# Patient Record
Sex: MALE | Race: BLACK OR AFRICAN AMERICAN | NOT HISPANIC OR LATINO | Employment: UNEMPLOYED | URBAN - METROPOLITAN AREA
[De-identification: names, ages, dates, MRNs, and addresses within clinical notes are randomized per-mention and may not be internally consistent; named-entity substitution may affect disease eponyms.]

---

## 2023-07-03 DIAGNOSIS — K50.90 CROHN'S DISEASE WITHOUT COMPLICATION, UNSPECIFIED GASTROINTESTINAL TRACT LOCATION: Primary | ICD-10-CM

## 2023-07-28 ENCOUNTER — TELEPHONE (OUTPATIENT)
Dept: GASTROENTEROLOGY | Facility: CLINIC | Age: 53
End: 2023-07-28

## 2023-07-28 NOTE — TELEPHONE ENCOUNTER
I did request records from Dr. Mercado, but they sent Dr. Dennis's records. I will contact Dr. Mercado's office.

## 2023-07-28 NOTE — TELEPHONE ENCOUNTER
----- Message from Ann Suarez sent at 7/28/2023 10:56 AM CDT -----  Regarding: Outside records  Pt has a referral in the q and it stated requesting records. The records are from Dr. Dennis's office 2019. Pt is currently est w/ Dr. Mercado in [place] and is being referred here by that  Will need those records. Pt's wife stated he is being referred here as Dr. Mercado is not informed with IBS or Crohns. Contact info is [phone]            Thank You  Arabella GARBER

## 2023-10-24 ENCOUNTER — OFFICE VISIT (OUTPATIENT)
Dept: GASTROENTEROLOGY | Facility: CLINIC | Age: 53
End: 2023-10-24
Payer: MEDICAID

## 2023-10-24 VITALS
TEMPERATURE: 98 F | HEIGHT: 66 IN | SYSTOLIC BLOOD PRESSURE: 126 MMHG | HEART RATE: 51 BPM | WEIGHT: 195.38 LBS | BODY MASS INDEX: 31.4 KG/M2 | RESPIRATION RATE: 12 BRPM | DIASTOLIC BLOOD PRESSURE: 72 MMHG

## 2023-10-24 DIAGNOSIS — K76.0 HEPATIC STEATOSIS: Primary | ICD-10-CM

## 2023-10-24 DIAGNOSIS — Z86.010 H/O ADENOMATOUS POLYP OF COLON: ICD-10-CM

## 2023-10-24 DIAGNOSIS — R93.89 ABNORMAL CT SCAN: ICD-10-CM

## 2023-10-24 PROBLEM — Z86.0101 H/O ADENOMATOUS POLYP OF COLON: Status: ACTIVE | Noted: 2023-10-24

## 2023-10-24 PROCEDURE — 99214 OFFICE O/P EST MOD 30 MIN: CPT | Mod: PBBFAC

## 2023-10-24 RX ORDER — SILDENAFIL 100 MG/1
100 TABLET, FILM COATED ORAL DAILY PRN
COMMUNITY
Start: 2023-10-14

## 2023-10-24 RX ORDER — LANSOPRAZOLE 30 MG/1
30 CAPSULE, DELAYED RELEASE ORAL DAILY
COMMUNITY
Start: 2023-10-14

## 2023-10-24 RX ORDER — LEVOCETIRIZINE DIHYDROCHLORIDE 5 MG/1
5 TABLET, FILM COATED ORAL DAILY PRN
COMMUNITY
Start: 2023-06-29

## 2023-10-24 RX ORDER — LOSARTAN POTASSIUM AND HYDROCHLOROTHIAZIDE 12.5; 5 MG/1; MG/1
1 TABLET ORAL DAILY
COMMUNITY
Start: 2023-10-14

## 2023-10-24 RX ORDER — POTASSIUM CHLORIDE 1500 MG/1
20 TABLET, EXTENDED RELEASE ORAL DAILY
COMMUNITY
Start: 2023-10-14

## 2023-10-24 RX ORDER — ROSUVASTATIN CALCIUM 10 MG/1
10 TABLET, COATED ORAL DAILY
COMMUNITY
Start: 2023-10-14

## 2023-10-24 RX ORDER — POLYETHYLENE GLYCOL 3350, SODIUM SULFATE, SODIUM CHLORIDE, POTASSIUM CHLORIDE, SODIUM ASCORBATE, AND ASCORBIC ACID 7.5-2.691G
2000 KIT ORAL ONCE
Qty: 1 KIT | Refills: 0 | Status: SHIPPED | OUTPATIENT
Start: 2023-10-24 | End: 2023-10-24

## 2023-10-24 NOTE — ASSESSMENT & PLAN NOTE
Check CMP  Schedule Fibroscan  Likely MASLD, will pursue further work-up if needed.     We discussed the manifestations of metabolic dysfunction associated steatotic liver disease. At this time, there are no FDA approved therapy for metabolic dysfunction associated steatotic liver disease.  The patient and I discussed the importance of diet, exercise, and weight loss for management of MASLD. We discussed a low fat, low carb/sugar, high fiber diet and a goal of 30 minutes of exercise 5 times per week  Advised weight loss (10%), strict glycemic control and lipid control (statins are ok if needed, prescribing doctor will need to monitor LFTs per routine)    Discussed that fatty liver can progress to steatohepatitis and possibly to cirrhosis, putting one at increased risk for liver cancer, liver failure, and death    Recommend to avoid alcohol consumption. It is know that heavy alcohol use is associated with disease progression among patients with fatty liver disease. Information is unknown at this time of the effects on the liver with alcohol use in moderation.

## 2023-10-24 NOTE — ASSESSMENT & PLAN NOTE
June 28, 2023: CT a/p with contrast: Segmental wall thickening/fatty infiltration of the ileum.  Fatty infiltration of wall of colon.  No evidence for acute inflammation.  Findings concerning for chronic inflammation such as IBD.     Chronic GI issues for years - no acute change in symptoms.   Check CBC, CMP, CRP, fecal calprotectin  Schedule colonoscopy

## 2023-10-24 NOTE — ASSESSMENT & PLAN NOTE
"June 16, 2023: Colonoscopy (Dr. Mercado): Benign 3-4 mm polyp in sigmoid s/p forceps polypectomy. 1.5 cm distal transverse colon "mass" s/p snare resection and tattoo.  Hepatic flexure 8-9 mm mass vs sessile polyp s/p partial forceps polypectomy with tattoo.  Pathology: tubular adenomas     Suspect polyps are endoscopically removable based on description from previous colonoscopy.  Will schedule repeat colonoscopy for definitive polypectomy  "

## 2023-10-24 NOTE — PROGRESS NOTES
Gastroenterology and Hepatology          New Patient Note         TODAY'S VISIT DATE:  10/24/2023    Reason for Consult:    Chief Complaint   Patient presents with    Crohn's Disease     ?? Abd pain, diarrhea, constipation       PCP: Hugh Callahan      Referring MD:   Paper Order    History of Present Illness:    Mr. Knox is a 52 year old AAM with adenomatous colon polyps, GERD here to establish care.     He has a 20 year history of severe crampy abdominal pain, which persists throughout the entire day. Pain is alleviated with holding the stomach, but patient cannot describe if alleviated with bowel movements. He reports having alternating constipation/diarrheal symptoms, noted that the past week he has been constipated, having minimal stools, and the previous week he was having dark brown loose stool movements that were 3-4x/day. Denies any melana or hematochezia. He has been limited in his ability to work because he has weakness, presyncopal episodes, but denies any actual loss of consciousness. Has variable PO intake, at times has small meals, and has not had any recent weight loss. He has been on multiple PPIs, which initially work, but then become ineffective at treating his stomach pain. He does feel relief from an acid reflux standpoint, and notes that if he does not take it, the pain would likely be a lot worse. Otherwise, denies any chest pain, nausea/vomiting.    He was referred to Dr. Mercado for evaluation of symptoms and colonoscopy.  Colonoscopy showed small polyps (largest 1.5 cm) all adenomas.  One polyp not able to be removed.  He was evaluated then for surgical resection of polyps.  Pre operative CT scan showed findings concerning for TI disease.  He was subsequently referred here.      He denies any FH of IBD, colon polyps or colon cancer.  Former smoker.  He denies any alcohol or recreational drug use.        Pertinent Endoscopy/Imaging:  February 13, 2019: EGD (Dr. Dennis):  "necrotic appearing mucosa from mid to distal esophagus with surrounding inflammation, small HH. Mild erosive gastritis    March 20, 2019: EGD (Dr. Dennis): normal esophagus, stomach, duodenum    September 18, 2019: Colonoscopy (Dr. Dennis): Grade II hemorrhoids otherwise normal colon    June 16, 2023: Colonoscopy (Dr. Mercado): Benign 3-4 mm polyp in sigmoid s/p forceps polypectomy. 1.5 cm distal transverse colon "mass" s/p snare resection and tattoo.  Hepatic flexure 8-9 mm mass vs sessile polyp s/p partial forceps polypectomy with tattoo.  Pathology: tubular adenomas     June 28, 2023: CT a/p with contrast: Segmental wall thickening/fatty infiltration of the ileum.  Fatty infiltration of wall of colon.  No evidence for acute inflammation.  Findings concerning for chronic inflammation such as IBD.  Hepatic steatosis.         Review of Systems   Constitutional:  Positive for fatigue. Negative for unexpected weight change.   Respiratory:  Positive for shortness of breath.    Gastrointestinal:  Positive for abdominal pain, change in bowel habit, constipation, diarrhea and reflux. Negative for abdominal distention, anal bleeding, blood in stool, nausea, rectal pain, vomiting and fecal incontinence.   Musculoskeletal:  Positive for arthralgias (knee and hips). Negative for back pain, gait problem, joint swelling and joint deformity.   Neurological:  Negative for dizziness and light-headedness.       Medical/Surgical History:   Past Medical History:   Diagnosis Date    Crohn's disease     Essential (primary) hypertension     GERD (gastroesophageal reflux disease)      Past Surgical History:   Procedure Laterality Date    COLONOSCOPY      ESOPHAGOGASTRODUODENOSCOPY           Family History:   Family History   Problem Relation Age of Onset    Diabetes Mother     Heart disease Mother     Cancer Father     Cervical cancer Sister     Heart failure Brother     Crohn's disease Other         Social History:   Social History " "    Socioeconomic History    Marital status:    Tobacco Use    Smoking status: Former     Types: Cigarettes     Passive exposure: Never    Smokeless tobacco: Never    Tobacco comments:     Quit 1994   Substance and Sexual Activity    Alcohol use: Never    Drug use: Yes     Types: Marijuana        Review of patient's allergies indicates:  No Known Allergies    Current Medications:   Outpatient Medications Marked as Taking for the 10/24/23 encounter (Office Visit) with RESIDENT, [facility] GASTR[facility]OLOGY   Medication Sig Dispense Refill    lansoprazole (PREVACID) 30 MG capsule Take 30 mg by mouth once daily.      levocetirizine (XYZAL) 5 MG tablet Take 5 mg by mouth daily as needed.      losartan-hydrochlorothiazide 50-12.5 mg (HYZAAR) 50-12.5 mg per tablet Take 1 tablet by mouth once daily.      potassium chloride (K-TAB) 20 mEq Take 20 mEq by mouth once daily.      rosuvastatin (CRESTOR) 10 MG tablet Take 10 mg by mouth once daily.      sildenafiL (VIAGRA) 100 MG tablet Take 100 mg by mouth daily as needed.          Vital Signs:  /72 (BP Location: Left arm, Patient Position: Sitting, BP Method: Medium (Automatic))   Pulse (!) 51   Temp 98.1 °F (36.7 °C) (Oral)   Resp 12   Ht 5' 6" (1.676 m)   Wt 88.6 kg (195 lb 6.4 oz)   BMI 31.54 kg/m²      Physical Exam  Vitals reviewed.   Constitutional:       Appearance: Normal appearance. He is obese.   HENT:      Head: Normocephalic and atraumatic.      Mouth/Throat:      Mouth: Mucous membranes are moist.   Eyes:      Extraocular Movements: Extraocular movements intact.      Conjunctiva/sclera: Conjunctivae normal.   Cardiovascular:      Rate and Rhythm: Normal rate and regular rhythm.   Pulmonary:      Effort: Pulmonary effort is normal.      Breath sounds: Normal breath sounds.   Abdominal:      General: Bowel sounds are normal.      Palpations: Abdomen is soft.      Tenderness: There is no abdominal tenderness.   Musculoskeletal:      Cervical back: Normal " "range of motion.   Skin:     General: Skin is warm and dry.   Neurological:      General: No focal deficit present.      Mental Status: He is alert and oriented to person, place, and time.   Psychiatric:         Mood and Affect: Mood normal.         Behavior: Behavior normal.         Labs: Reviewed  Hgb   Date Value Ref Range Status   10/24/2023 14.6 14.0 - 18.0 g/dL Final     Albumin Level   Date Value Ref Range Status   10/24/2023 4.1 3.5 - 5.0 g/dL Final     Iron Level   Date Value Ref Range Status   10/24/2023 76 65 - 175 ug/dL Final     Ferritin Level   Date Value Ref Range Status   10/24/2023 141.45 21.81 - 274.66 ng/mL Final     Vit D 25 OH   Date Value Ref Range Status   10/24/2023 117.8 (H) 30.0 - 80.0 ng/mL Final     C-Reactive Protein   Date Value Ref Range Status   10/24/2023 4.10 <5.00 mg/L Final      Fecal calprotectin:       Assessment/Plan:      Problem List Items Addressed This Visit          GI    H/O adenomatous polyp of colon     June 16, 2023: Colonoscopy (Dr. Mercado): Benign 3-4 mm polyp in sigmoid s/p forceps polypectomy. 1.5 cm distal transverse colon "mass" s/p snare resection and tattoo.  Hepatic flexure 8-9 mm mass vs sessile polyp s/p partial forceps polypectomy with tattoo.  Pathology: tubular adenomas     Suspect polyps are endoscopically removable based on description from previous colonoscopy.  Will schedule repeat colonoscopy for definitive polypectomy         Relevant Orders    Vitamin D (Completed)    Varicella Zoster Antibody, IgG (Completed)    Iron and TIBC (Completed)    Hepatitis B Surface Ab, Qualitative (Completed)    Hepatitis A antibody, IgG (Completed)    Ferritin (Completed)    C-Reactive Protein (Completed)    Comprehensive Metabolic Panel (Completed)    CBC Auto Differential (Completed)    Hepatic steatosis - Primary     Check CMP  Schedule Fibroscan  Likely MASLD, will pursue further work-up if needed.     We discussed the manifestations of metabolic dysfunction " associated steatotic liver disease. At this time, there are no FDA approved therapy for metabolic dysfunction associated steatotic liver disease.  The patient and I discussed the importance of diet, exercise, and weight loss for management of MASLD. We discussed a low fat, low carb/sugar, high fiber diet and a goal of 30 minutes of exercise 5 times per week  Advised weight loss (10%), strict glycemic control and lipid control (statins are ok if needed, prescribing doctor will need to monitor LFTs per routine)    Discussed that fatty liver can progress to steatohepatitis and possibly to cirrhosis, putting one at increased risk for liver cancer, liver failure, and death    Recommend to avoid alcohol consumption. It is know that heavy alcohol use is associated with disease progression among patients with fatty liver disease. Information is unknown at this time of the effects on the liver with alcohol use in moderation.           Relevant Orders    Vitamin D (Completed)    Varicella Zoster Antibody, IgG (Completed)    Iron and TIBC (Completed)    Hepatitis B Surface Ab, Qualitative (Completed)    Hepatitis A antibody, IgG (Completed)    Ferritin (Completed)    C-Reactive Protein (Completed)    Comprehensive Metabolic Panel (Completed)    CBC Auto Differential (Completed)    US Elastography Liver w/imaging       Other    Abnormal CT scan     June 28, 2023: CT a/p with contrast: Segmental wall thickening/fatty infiltration of the ileum.  Fatty infiltration of wall of colon.  No evidence for acute inflammation.  Findings concerning for chronic inflammation such as IBD.     Chronic GI issues for years - no acute change in symptoms.   Check CBC, CMP, CRP, fecal calprotectin  Schedule colonoscopy         Relevant Orders    CBC Auto Differential    Comprehensive Metabolic Panel    C-Reactive Protein    Calprotectin, Stool    Iron and TIBC    Ferritin    Vitamin D    Hepatitis A antibody, IgG    Hepatitis B Surface Ab,  Qualitative    Varicella Zoster Antibody, IgG    Case Request Endoscopy: EGD (ESOPHAGOGASTRODUODENOSCOPY), COLONOSCOPY (Completed)    Vitamin D (Completed)    Varicella Zoster Antibody, IgG (Completed)    Iron and TIBC (Completed)    Hepatitis B Surface Ab, Qualitative (Completed)    Hepatitis A antibody, IgG (Completed)    Ferritin (Completed)    C-Reactive Protein (Completed)    Comprehensive Metabolic Panel (Completed)    CBC Auto Differential (Completed)               HEALTH MAINTENANCE:     Vaccinations:    Influenza (inactive):  recommended annually   PCV 20 (Prevnar): recommended  Tetanus (TdaP): recommended every 10 years  HPV (males and females ages 11-44 yo): N/A  Meningococcal: UTD, No risk factors   Hepatitis B: Check HBsAb  Hepatitis A:  Check HAV IgG  MMR (live vaccine): UTD        Chickenpox status/Varicella (live vaccine):  will check immunity     Shingrix: recommend  COVID-19: vaccinated 2021      Colorectal cancer risk:  Risk factors: >  8 years of disease, > 1/3 colon involved    - Distribution of colonic disease: > 1/3 of colon     - Year of symptom onset:    - Colonoscopy every 1-3 years after endoscopic remission    Ophthalmologic exam recommended yearly  Dermatologic exam recommended yearly due to risk of skin cancer with IBD/meds    Bone health:   Calcium 9671-5858 mg daily and vitamin D 800 IU daily   Risk factors for osteopenia/osteoporosis: none   Vitamin D:    Ergocalciferol 50,000 IU weekly x 12 weeks     DEXA scan: Recommend baseline    Smoking status: former    Follow up: following colonoscopy      Attending attestation to follow.    Maximiliano Robles DO  [facility], PGY-II

## 2023-11-01 NOTE — PROGRESS NOTES
GASTROENTEROLOGY/HEPATOLOGY ATTENDING ADDENDUM:     I have seen the patient, reviewed the resident's history, physical, assessment, and plan. I have personally interviewed and examined the patient.       My changes and additions to history, physical, assessment and plan reflected and outlined in resident's note.           Aurea Lara MD, MPH   of Clinical Medicine  Gastroenterology and Hepatology   [facility]

## 2023-12-01 ENCOUNTER — PROCEDURE VISIT (OUTPATIENT)
Dept: INFECTIOUS DISEASES | Facility: CLINIC | Age: 53
End: 2023-12-01
Payer: MEDICAID

## 2023-12-01 ENCOUNTER — TELEPHONE (OUTPATIENT)
Dept: GASTROENTEROLOGY | Facility: CLINIC | Age: 53
End: 2023-12-01
Payer: MEDICAID

## 2023-12-01 DIAGNOSIS — K76.0 HEPATIC STEATOSIS: ICD-10-CM

## 2023-12-01 PROCEDURE — 91200 LIVER ELASTOGRAPHY: CPT | Mod: PBBFAC | Performed by: INTERNAL MEDICINE

## 2024-02-07 ENCOUNTER — PATIENT MESSAGE (OUTPATIENT)
Dept: ADMINISTRATIVE | Facility: OTHER | Age: 54
End: 2024-02-07
Payer: MEDICAID

## 2024-02-09 ENCOUNTER — ANESTHESIA EVENT (OUTPATIENT)
Dept: ENDOSCOPY | Facility: HOSPITAL | Age: 54
End: 2024-02-09
Payer: MEDICAID

## 2024-02-09 ENCOUNTER — ANESTHESIA (OUTPATIENT)
Dept: ENDOSCOPY | Facility: HOSPITAL | Age: 54
End: 2024-02-09
Payer: MEDICAID

## 2024-02-09 ENCOUNTER — HOSPITAL ENCOUNTER (OUTPATIENT)
Facility: HOSPITAL | Age: 54
Discharge: HOME | End: 2024-02-09
Attending: INTERNAL MEDICINE | Admitting: INTERNAL MEDICINE
Payer: MEDICAID

## 2024-02-09 VITALS
OXYGEN SATURATION: 99 % | BODY MASS INDEX: 26.84 KG/M2 | DIASTOLIC BLOOD PRESSURE: 72 MMHG | RESPIRATION RATE: 17 BRPM | HEART RATE: 60 BPM | TEMPERATURE: 97 F | SYSTOLIC BLOOD PRESSURE: 112 MMHG | HEIGHT: 66 IN | WEIGHT: 167 LBS

## 2024-02-09 DIAGNOSIS — Z86.010 H/O ADENOMATOUS POLYP OF COLON: Primary | ICD-10-CM

## 2024-02-09 DIAGNOSIS — R93.89 ABNORMAL CT SCAN: ICD-10-CM

## 2024-02-09 PROCEDURE — 63600175 PHARM REV CODE 636 W HCPCS: Performed by: NURSE ANESTHETIST, CERTIFIED REGISTERED

## 2024-02-09 PROCEDURE — 88312 SPECIAL STAINS GROUP 1: CPT

## 2024-02-09 PROCEDURE — 27201423 OPTIME MED/SURG SUP & DEVICES STERILE SUPPLY: Performed by: INTERNAL MEDICINE

## 2024-02-09 PROCEDURE — 25000003 PHARM REV CODE 250: Performed by: NURSE ANESTHETIST, CERTIFIED REGISTERED

## 2024-02-09 PROCEDURE — 37000008 HC ANESTHESIA 1ST 15 MINUTES: Performed by: INTERNAL MEDICINE

## 2024-02-09 PROCEDURE — 88313 SPECIAL STAINS GROUP 2: CPT

## 2024-02-09 PROCEDURE — D9220A PRA ANESTHESIA: Mod: CRNA,,, | Performed by: NURSE ANESTHETIST, CERTIFIED REGISTERED

## 2024-02-09 PROCEDURE — 45385 COLONOSCOPY W/LESION REMOVAL: CPT | Performed by: INTERNAL MEDICINE

## 2024-02-09 PROCEDURE — 37000009 HC ANESTHESIA EA ADD 15 MINS: Performed by: INTERNAL MEDICINE

## 2024-02-09 PROCEDURE — 43239 EGD BIOPSY SINGLE/MULTIPLE: CPT | Performed by: INTERNAL MEDICINE

## 2024-02-09 PROCEDURE — 88305 TISSUE EXAM BY PATHOLOGIST: CPT | Performed by: INTERNAL MEDICINE

## 2024-02-09 PROCEDURE — D9220A PRA ANESTHESIA: Mod: ANES,,, | Performed by: ANESTHESIOLOGY

## 2024-02-09 RX ORDER — LIDOCAINE HYDROCHLORIDE 20 MG/ML
INJECTION, SOLUTION EPIDURAL; INFILTRATION; INTRACAUDAL; PERINEURAL
Status: DISCONTINUED | OUTPATIENT
Start: 2024-02-09 | End: 2024-02-09

## 2024-02-09 RX ORDER — KETAMINE HYDROCHLORIDE 100 MG/ML
INJECTION, SOLUTION INTRAMUSCULAR; INTRAVENOUS
Status: DISCONTINUED | OUTPATIENT
Start: 2024-02-09 | End: 2024-02-09

## 2024-02-09 RX ORDER — PROPOFOL 10 MG/ML
VIAL (ML) INTRAVENOUS
Status: COMPLETED
Start: 2024-02-09 | End: 2024-02-09

## 2024-02-09 RX ORDER — LIDOCAINE HYDROCHLORIDE 20 MG/ML
INJECTION, SOLUTION EPIDURAL; INFILTRATION; INTRACAUDAL; PERINEURAL
Status: COMPLETED
Start: 2024-02-09 | End: 2024-02-09

## 2024-02-09 RX ORDER — KETAMINE HCL IN 0.9 % NACL 50 MG/5 ML
SYRINGE (ML) INTRAVENOUS
Status: DISCONTINUED
Start: 2024-02-09 | End: 2024-02-09 | Stop reason: HOSPADM

## 2024-02-09 RX ORDER — GLYCOPYRROLATE 0.2 MG/ML
INJECTION INTRAMUSCULAR; INTRAVENOUS
Status: DISCONTINUED
Start: 2024-02-09 | End: 2024-02-09 | Stop reason: HOSPADM

## 2024-02-09 RX ORDER — PROPOFOL 10 MG/ML
VIAL (ML) INTRAVENOUS CONTINUOUS PRN
Status: DISCONTINUED | OUTPATIENT
Start: 2024-02-09 | End: 2024-02-09

## 2024-02-09 RX ADMIN — PROPOFOL 200 MCG/KG/MIN: 10 INJECTION, EMULSION INTRAVENOUS at 09:02

## 2024-02-09 RX ADMIN — PROPOFOL 50 MG: 10 INJECTION, EMULSION INTRAVENOUS at 09:02

## 2024-02-09 RX ADMIN — SODIUM CHLORIDE: 0.9 INJECTION, SOLUTION INTRAVENOUS at 09:02

## 2024-02-09 RX ADMIN — LIDOCAINE HYDROCHLORIDE 5 ML: 20 INJECTION, SOLUTION INTRAVENOUS at 09:02

## 2024-02-09 RX ADMIN — KETAMINE HYDROCHLORIDE 10 MG: 100 INJECTION INTRAMUSCULAR; INTRAVENOUS at 09:02

## 2024-02-09 NOTE — ANESTHESIA PREPROCEDURE EVALUATION
02/09/2024  Aakash Knox is a 53 y.o., male.    Pre-op Diagnosis: * No pre-op diagnosis entered *    Procedure(s):  DOUBLE  COLON     Review of patient's allergies indicates:  No Known Allergies    Current Outpatient Medications   Medication Instructions    lansoprazole (PREVACID) 30 mg, Oral, Daily    levocetirizine (XYZAL) 5 mg, Oral, Daily PRN    losartan-hydrochlorothiazide 50-12.5 mg (HYZAAR) 50-12.5 mg per tablet 1 tablet, Oral, Daily    potassium chloride (K-TAB) 20 mEq 20 mEq, Oral, Daily    rosuvastatin (CRESTOR) 10 mg, Oral, Daily    sildenafiL (VIAGRA) 100 mg, Oral, Daily PRN       DOUBLE;COLON;SD COLONOSCOPY,DIAGNOSTIC [45325];SD EGD, FLEX*    Past Medical History:   Diagnosis Date    Essential (primary) hypertension     GERD (gastroesophageal reflux disease)     Mixed hyperlipidemia        Past Surgical History:   Procedure Laterality Date    COLONOSCOPY      ESOPHAGOGASTRODUODENOSCOPY        Lab Results   Component Value Date    WBC 6.58 10/24/2023    HGB 14.6 10/24/2023    HCT 42.5 10/24/2023    MCV 80.5 10/24/2023     10/24/2023          BMP  Lab Results   Component Value Date     10/24/2023    K 3.9 10/24/2023    CO2 27 10/24/2023    BUN 10.5 10/24/2023    CREATININE 0.97 10/24/2023    CALCIUM 9.8 10/24/2023        Cardiac Cath 9/16/2020  Right coronary artery large and dominant free of any disease.  Left main   coronary artery is normal LAD is normal circumflex arteries normal EF is   around 60% with a hand-injection            Pre-op Assessment    I have reviewed the Patient Summary Reports.    I have reviewed the NPO Status.   I have reviewed the Medications.     Review of Systems  Anesthesia Hx:  No problems with previous Anesthesia             Denies Family Hx of Anesthesia complications.    Denies Personal Hx of Anesthesia complications.                     Social:  Non-Smoker       Cardiovascular:  Exercise tolerance: good   Hypertension       Denies Angina.   Denies Orthopnea.  Denies PND.    Denies LORD.    Functional Capacity good / => 4 METS                         Hepatic/GI:     GERD Liver Disease,            Musculoskeletal:  Musculoskeletal Normal                Neurological:  Denies TIA.  Denies CVA.                                    Psych:  Psychiatric Normal                    Physical Exam  General: Well nourished, Alert and Oriented    Airway:  Mallampati: III   Mouth Opening: Normal  TM Distance: Normal  Tongue: Normal  Neck ROM: Normal ROM    Dental:  Chipped upper central incisor, missing some rear molars (one pulled this week)  Chest/Lungs:  Clear to auscultation    Heart:  Rate: Normal  Rhythm: Regular Rhythm  No pretibial edema  No carotid bruits      Anesthesia Plan  Type of Anesthesia, risks & benefits discussed:    Anesthesia Type: Gen Natural Airway  Intra-op Monitoring Plan: Standard ASA Monitors  Post Op Pain Control Plan: IV/PO Opioids PRN  Induction:  IV  Informed Consent: Informed consent signed with the Patient and all parties understand the risks and agree with anesthesia plan.  All questions answered. Patient consented to blood products? No  ASA Score: 3  Day of Surgery Review of History & Physical: H&P Update referred to the surgeon/provider.  Anesthesia Plan Notes: GA TIVA    Ready For Surgery From Anesthesia Perspective.     .

## 2024-02-09 NOTE — PROVATION PATIENT INSTRUCTIONS
Discharge Summary/Instructions after an Endoscopic Procedure  Patient Name: Aakash Knox  Patient MRN: [id number]  Patient YOB: 1970 Friday, February 9, 2024  Aurea Lara MD  Dear patient,  As a result of recent federal legislation (The Federal Cures Act), you may   receive lab or pathology results from your procedure in your [facility]   account before your physician is able to contact you. Your physician or   their representative will relay the results to you with their   recommendations at their soonest availability.  Thank you,  RESTRICTIONS:  During your procedure today, you received medications for sedation.  These   medications may affect your judgment, balance and coordination.  Therefore,   for 24 hours, you have the following restrictions:   - DO NOT drive a car, operate machinery, make legal/financial decisions,   sign important papers or drink alcohol.    ACTIVITY:  Today: no heavy lifting, straining or running due to procedural   sedation/anesthesia.  The following day: return to full activity including work.  DIET:  Eat and drink normally unless instructed otherwise.     TREATMENT FOR COMMON SIDE EFFECTS:  - Mild abdominal pain, nausea, belching, bloating or excessive gas:  rest,   eat lightly and use a heating pad.  - Sore Throat: treat with throat lozenges and/or gargle with warm salt   water.  - Because air was used during the procedure, expelling large amounts of air   from your rectum or belching is normal.  - If a bowel prep was taken, you may not have a bowel movement for 1-3 days.    This is normal.  SYMPTOMS TO WATCH FOR AND REPORT TO YOUR PHYSICIAN:  1. Abdominal pain or bloating, other than gas cramps.  2. Chest pain.  3. Back pain.  4. Signs of infection such as: chills or fever occurring within 24 hours   after the procedure.  5. Rectal bleeding, which would show as bright red, maroon, or black stools.   (A tablespoon of blood from the rectum is not serious,  especially if   hemorrhoids are present.)  6. Vomiting.  7. Weakness or dizziness.  GO DIRECTLY TO THE NEAREST EMERGENCY ROOM IF YOU HAVE ANY OF THE FOLLOWING:      Difficulty breathing              Chills and/or fever over 101 F   Persistent vomiting and/or vomiting blood   Severe abdominal pain   Severe chest pain   Black, tarry stools   Bleeding- more than one tablespoon   Any other symptom or condition that you feel may need urgent attention  Your doctor recommends these additional instructions:  If any biopsies were taken, your doctors clinic will contact you in 1 to 2   weeks with any results.  - Patient has a contact number available for emergencies.  The signs and   symptoms of potential delayed complications were discussed with the   patient.  Return to normal activities tomorrow.  Written discharge   instructions were provided to the patient.   - Discharge patient to home.   - Resume previous diet.   - Continue present medications.   - Await pathology results.  For questions, problems or results please call your physician - Aurea Lara MD at Work:  [phone], Work:  [phone].  [facility], EMERGENCY ROOM PHONE NUMBER: [phone]  IF A COMPLICATION OR EMERGENCY SITUATION ARISES AND YOU ARE UNABLE TO REACH   YOUR PHYSICIAN - GO DIRECTLY TO THE EMERGENCY ROOM.  Aurea Lara MD  2/9/2024 10:13:29 AM  This report has been verified and signed electronically.  Dear patient,  As a result of recent federal legislation (The Federal Cures Act), you may   receive lab or pathology results from your procedure in your [facility]   account before your physician is able to contact you. Your physician or   their representative will relay the results to you with their   recommendations at their soonest availability.  Thank you,  PROVATION

## 2024-02-09 NOTE — ANESTHESIA POSTPROCEDURE EVALUATION
Anesthesia Post Evaluation    Patient: Aakash Knox    Procedure(s) Performed: Procedure(s) (LRB):  DOUBLE (N/A)  COLON (N/A)  EGD, WITH CLOSED BIOPSY    Final Anesthesia Type: general      Patient location during evaluation: PACU  Patient participation: Yes- Able to Participate  Level of consciousness: awake and alert and oriented  Post-procedure vital signs: reviewed and stable  Pain management: adequate  Airway patency: patent    PONV status at discharge: No PONV  Anesthetic complications: no      Cardiovascular status: hemodynamically stable  Respiratory status: unassisted  Hydration status: euvolemic  Follow-up not needed.              Vitals Value Taken Time   /60 02/09/24 1010   Temp 36 °C (96.8 °F) 02/09/24 0957   Pulse 63 02/09/24 1010   Resp 22 02/09/24 1010   SpO2 65 % 02/09/24 1010         No case tracking events are documented in the log.      Pain/Artemio Score: Artemio Score: 10 (2/9/2024 10:11 AM)

## 2024-02-09 NOTE — PROVATION PATIENT INSTRUCTIONS
Discharge Summary/Instructions after an Endoscopic Procedure  Patient Name: Aakash Knox  Patient MRN: [id number]  Patient YOB: 1970 Friday, February 9, 2024  Aurea Lara MD  Dear patient,  As a result of recent federal legislation (The Federal Cures Act), you may   receive lab or pathology results from your procedure in your [facility]   account before your physician is able to contact you. Your physician or   their representative will relay the results to you with their   recommendations at their soonest availability.  Thank you,  RESTRICTIONS:  During your procedure today, you received medications for sedation.  These   medications may affect your judgment, balance and coordination.  Therefore,   for 24 hours, you have the following restrictions:   - DO NOT drive a car, operate machinery, make legal/financial decisions,   sign important papers or drink alcohol.    ACTIVITY:  Today: no heavy lifting, straining or running due to procedural   sedation/anesthesia.  The following day: return to full activity including work.  DIET:  Eat and drink normally unless instructed otherwise.     TREATMENT FOR COMMON SIDE EFFECTS:  - Mild abdominal pain, nausea, belching, bloating or excessive gas:  rest,   eat lightly and use a heating pad.  - Sore Throat: treat with throat lozenges and/or gargle with warm salt   water.  - Because air was used during the procedure, expelling large amounts of air   from your rectum or belching is normal.  - If a bowel prep was taken, you may not have a bowel movement for 1-3 days.    This is normal.  SYMPTOMS TO WATCH FOR AND REPORT TO YOUR PHYSICIAN:  1. Abdominal pain or bloating, other than gas cramps.  2. Chest pain.  3. Back pain.  4. Signs of infection such as: chills or fever occurring within 24 hours   after the procedure.  5. Rectal bleeding, which would show as bright red, maroon, or black stools.   (A tablespoon of blood from the rectum is not serious,  especially if   hemorrhoids are present.)  6. Vomiting.  7. Weakness or dizziness.  GO DIRECTLY TO THE NEAREST EMERGENCY ROOM IF YOU HAVE ANY OF THE FOLLOWING:      Difficulty breathing              Chills and/or fever over 101 F   Persistent vomiting and/or vomiting blood   Severe abdominal pain   Severe chest pain   Black, tarry stools   Bleeding- more than one tablespoon   Any other symptom or condition that you feel may need urgent attention  Your doctor recommends these additional instructions:  If any biopsies were taken, your doctors clinic will contact you in 1 to 2   weeks with any results.  - Patient has a contact number available for emergencies.  The signs and   symptoms of potential delayed complications were discussed with the   patient.  Return to normal activities tomorrow.  Written discharge   instructions were provided to the patient.   - Discharge patient to home.   - Resume previous diet.   - Continue present medications.   - Await pathology results.   - Repeat colonoscopy in 3 years for surveillance.   - Use fiber, for example Citrucel, Fibercon, Konsyl or Metamucil.   - Will follow-up pathology and treat for IBS at this time.  For questions, problems or results please call your physician - Aurea Lara MD at Work:  [phone], Work:  [phone].  [facility], EMERGENCY ROOM PHONE NUMBER: [phone]  IF A COMPLICATION OR EMERGENCY SITUATION ARISES AND YOU ARE UNABLE TO REACH   YOUR PHYSICIAN - GO DIRECTLY TO THE EMERGENCY ROOM.  Aurea Lara MD  2/9/2024 10:11:29 AM  This report has been verified and signed electronically.  Dear patient,  As a result of recent federal legislation (The Federal Cures Act), you may   receive lab or pathology results from your procedure in your [facility]   account before your physician is able to contact you. Your physician or   their representative will relay the results to you with their   recommendations at their soonest  availability.  Thank you,  PROVATION

## 2024-02-09 NOTE — H&P
EGD and Colonoscopy History and Physical    Patient Name: Aakash Knox  MRN: [id number]  : 1970  Date of Procedure:  2024  Referring Physician: RESIDENT, [facility] CM*  Primary Physician: Hugh Callahan, RAMONE  Procedure Physician: Aurea Lara MD, MPH     Procedure - EGD and Colonoscopy  ASA - per anesthesia  Mallampati - per anesthesia  History of Anesthesia problems - no  Family history Anesthesia problems -  no   Plan of anesthesia - General    Diagnosis:  abdomina pain,  altered bowel habits  Chief Complaint: Same as above    HPI: Patient is an 53 y.o. male is here for the above.     Mr. Knox is a 53 year old AAM with adenomatous colon polyps, GERD here for an EGD and colonoscopy.      He has a 20 year history of severe crampy abdominal pain, which persists throughout the entire day. Pain is alleviated with holding the stomach, but patient cannot describe if alleviated with bowel movements. He reports having alternating constipation/diarrheal symptoms and when initially seen he was having more constipation.   Denies any melena or hematochezia. He has been limited in his ability to work because he has weakness, presyncopal episodes, but denies any actual loss of consciousness. Rep[orted variable PO intake, at times has small meals, but had not had any recent weight loss. He has been on multiple PPIs, which initially work, but then become ineffective at treating his stomach pain. He does feel relief from an acid reflux standpoint, and notes that if he does not take it, the pain would likely be a lot worse. Otherwise, denied any chest pain, nausea/vomiting.     He was referred to Dr. Mercado for evaluation of symptoms and colonoscopy.  Colonoscopy 2023 showed small polyps (largest 1.5 cm) all adenomas.  One polyp not able to be removed.  He was evaluated then for surgical resection of polyps.  Pre operative CT scan showed findings concerning for TI disease.  He was subsequently referred  "here.      October 2023 CRP: 4.1 (normal). Normal CBC, CMP, ferritin.   December 2023 fecal calprotectin: 159    Today he reports continued alternating diarrhea and constipation along with abdominal pain and cramping.  This past week had significant cramping for a few hours followed by diarrhea.  He has some referred pain to his groin with the pain.  Otherwise no changes in symptoms.      He denies any FH of IBD, colon polyps or colon cancer.  Former smoker.  He denies any alcohol or recreational drug use.          Pertinent Endoscopy/Imaging:  February 13, 2019: EGD (Dr. Dennis): necrotic appearing mucosa from mid to distal esophagus with surrounding inflammation, small HH. Mild erosive gastritis     March 20, 2019: EGD (Dr. Dennis): normal esophagus, stomach, duodenum     September 18, 2019: Colonoscopy (Dr. Dennis): Grade II hemorrhoids otherwise normal colon     June 16, 2023: Colonoscopy (Dr. Mercado): Benign 3-4 mm polyp in sigmoid s/p forceps polypectomy. 1.5 cm distal transverse colon "mass" s/p snare resection and tattoo.  Hepatic flexure 8-9 mm mass vs sessile polyp s/p partial forceps polypectomy with tattoo.  Pathology: tubular adenomas      June 28, 2023: CT a/p with contrast: Segmental wall thickening/fatty infiltration of the ileum.  Fatty infiltration of wall of colon.  No evidence for acute inflammation.  Findings concerning for chronic inflammation such as IBD.  Hepatic steatosis.          ROS:  Constitutional: No fevers, chills, No weight loss  CV: No chest pain  Pulm: No cough, No shortness of breath  GI: see HPI    Medical History:   Past Medical History:   Diagnosis Date    Essential (primary) hypertension     GERD (gastroesophageal reflux disease)     Mixed hyperlipidemia          Surgical History:   Past Surgical History:   Procedure Laterality Date    COLONOSCOPY      ESOPHAGOGASTRODUODENOSCOPY         Family History:   Family History   Problem Relation Age of Onset    Diabetes Mother     " "Heart disease Mother     Cancer Father     Cervical cancer Sister     Heart failure Brother     Crohn's disease Other    .    Social History:   Social History     Socioeconomic History    Marital status:    Tobacco Use    Smoking status: Former     Types: Cigarettes     Passive exposure: Never    Smokeless tobacco: Never    Tobacco comments:     Quit 1994   Substance and Sexual Activity    Alcohol use: Never    Drug use: Yes     Types: Marijuana       Review of patient's allergies indicates:  No Known Allergies    Medications:   Medications Prior to Admission   Medication Sig Dispense Refill Last Dose    lansoprazole (PREVACID) 30 MG capsule Take 30 mg by mouth once daily.   2/8/2024    levocetirizine (XYZAL) 5 MG tablet Take 5 mg by mouth daily as needed.   2/8/2024    losartan-hydrochlorothiazide 50-12.5 mg (HYZAAR) 50-12.5 mg per tablet Take 1 tablet by mouth once daily.   2/9/2024    potassium chloride (K-TAB) 20 mEq Take 20 mEq by mouth once daily.   2/8/2024    rosuvastatin (CRESTOR) 10 MG tablet Take 10 mg by mouth once daily.   2/8/2024    sildenafiL (VIAGRA) 100 MG tablet Take 100 mg by mouth daily as needed.   2/8/2024         Physical Exam:    Vital Signs:   Vitals:    02/09/24 0829   BP: 128/78   Pulse: (!) 56   Resp: 19   Temp: 97 °F (36.1 °C)     /78 (BP Location: Left arm, Patient Position: Sitting)   Pulse (!) 56   Temp 97 °F (36.1 °C) (Tympanic)   Resp 19   Ht 5' 6" (1.676 m)   Wt 75.8 kg (167 lb)   SpO2 98% Comment: room air  BMI 26.95 kg/m²     General:          Well appearing in no acute distress  Lungs: Clear to auscultation bilaterally, respirations unlabored  Heart: Regular rate and rhythm, S1 and S2 normal, no obvious murmurs  Abdomen:         Soft, non-tender, bowel sounds normal, no masses, no organomegaly        Labs:  Lab Results   Component Value Date    WBC 6.58 10/24/2023    HGB 14.6 10/24/2023    HCT 42.5 10/24/2023    MCV 80.5 10/24/2023     10/24/2023 " "    No results found for: "INR", "PT", "APTT"  Lab Results   Component Value Date     10/24/2023    K 3.9 10/24/2023    CO2 27 10/24/2023    BUN 10.5 10/24/2023    CREATININE 0.97 10/24/2023    LABPROT 7.4 10/24/2023    ALBUMIN 4.1 10/24/2023    BILITOT 0.8 10/24/2023    ALKPHOS 108 10/24/2023    ALT 18 10/24/2023    AST 20 10/24/2023       Assessment and Plan:     History reviewed, vital signs satisfactory, cardiopulmonary status satisfactory.  I have explained the sedation options, risks, benefits, and alternatives of this endoscopic procedure to the patient including but not limited to bleeding, inflammation, infection, perforation, and death.  All questions were answered and the patient consented to proceed with procedure as planned.   The patient is deemed an appropriate candidate for the sedation as planned.      Aurea Lara MD, MPH   of Clinical Medicine  Gastroenterology and Hepatology  [facility]    2/9/2024  8:56 AM     "

## 2024-02-09 NOTE — DISCHARGE INSTRUCTIONS
INSTRUCTIONS GIVEN TO PATIENT AND WIFE, JUANITA, WRITTEN AND VERBALLY.  BOTH VERBALIZED UNDERSTANDING.

## 2024-02-15 LAB — PSYCHE PATHOLOGY RESULT: NORMAL

## 2024-02-19 ENCOUNTER — PATIENT MESSAGE (OUTPATIENT)
Dept: ADMINISTRATIVE | Facility: OTHER | Age: 54
End: 2024-02-19
Payer: MEDICAID

## 2024-02-23 NOTE — PROCEDURES
Fibroscan Procedure     Name: Aakash Knox  Date of Procedure : 2023  Interpreting Physician: Aurea Lara MD, MPH  Diagnosis: MASLD    Probe: XL    Fibroscan reading: 3.1 kPa    Fibrosis: F 0-1     CAP readin dB/m    Steatosis: S0      Miscellaneous:

## 2024-04-29 PROBLEM — K58.2 IRRITABLE BOWEL SYNDROME WITH BOTH CONSTIPATION AND DIARRHEA: Status: ACTIVE | Noted: 2024-04-29

## 2024-04-29 NOTE — PROGRESS NOTES
Gastroenterology and Hepatology          New Patient Note         TODAY'S VISIT DATE:  4/30/2024    Reason for Consult:    Chief Complaint   Patient presents with    Hepatic steatosis    Irritable Bowel Syndrome       PCP: Hugh Callahan      Referring MD:   No ref. provider found    History of Present Illness:    Mr. Knox is a 53 year old AAM with adenomatous colon polyps, IBS, dyspepsia here for follow-up     When initially evaluated he reported a 20 year history of severe crampy abdominal pain, which persists throughout the entire day and  alleviated with holding the stomach.  He reported having alternating constipation and diarrhea without any bleeding. He had been limited in his ability to work because he has weakness, presyncopal episodes.  He denied any weight loss.  He had been on multiple PPIs, which initially work, but then become ineffective at treating his stomach pain. PPIs did seem to help with heartburn.    He was referred to Dr. Mercado for evaluation of symptoms and colonoscopy.  Colonoscopy showed small polyps (largest 1.5 cm) all adenomas.  One polyp not able to be removed.  He was evaluated then for surgical resection of polyps.  Pre operative CT scan showed findings concerning for TI disease.  He was subsequently referred here.      CBC, CMP, Iron panel, ferritin, TSH all normal.  Fecal calprotectin: 159.     I performed a EGD and colonoscopy on February 9, 2024.   EGD: irregular Z line but otherwise normal findings and negative H pylori and celiac biopsies  Colonoscopy: Normal TI, Two subcentimeter tubular adenomas removed.  Two tattoo sites seen without residual polyp. External and internal hemorrhoids     I recommended soluble fiber supplement and Ibgard.     Today he reports generalized abdominal cramping pain daily.  He has abdominal pain that wakes him up at night as well.  His symptoms are worse with milk/dairy, heavy sauce/cream sauce, nuts/seeds and he avoids these  "triggers.  He has alternating diarrhea and constipation, most days having stomach cramps associated with 4-5 loose stools.  He will take pepto to help.  He will then have 2 days of hard, small volume, dark stools with incomplete evacuation.  He continues to have fatigue, LORD.  He takes Ibgard daily which he thinks helps with cramping.  He denies any weight loss or rectal bleeding.     He has been told he had ulcers in the past on EGD.  He is cycled on PPI by PCP, currently on prevacid which helps with heartburn symptom.      He smokes marijuana. He denies any FH of IBD, colon polyps or colon cancer.  Former smoker.  He denies any alcohol or recreational drug use.      Pertinent Endoscopy/Imaging:  February 13, 2019: EGD (Dr. Dennis): necrotic appearing mucosa from mid to distal esophagus with surrounding inflammation, small HH. Mild erosive gastritis    March 20, 2019: EGD (Dr. Dennis): normal esophagus, stomach, duodenum    September 18, 2019: Colonoscopy (Dr. Dennis): Grade II hemorrhoids otherwise normal colon    June 16, 2023: Colonoscopy (Dr. Mercado): Benign 3-4 mm polyp in sigmoid s/p forceps polypectomy. 1.5 cm distal transverse colon "mass" s/p snare resection and tattoo.  Hepatic flexure 8-9 mm mass vs sessile polyp s/p partial forceps polypectomy with tattoo.  Pathology: tubular adenomas     June 28, 2023: CT a/p with contrast: Segmental wall thickening/fatty infiltration of the ileum.  Fatty infiltration of wall of colon.  No evidence for acute inflammation.  Findings concerning for chronic inflammation such as IBD.  Hepatic steatosis.     February 9, 2024: EGD: irregular Z line but otherwise normal findings and negative H pylori and celiac biopsies  February 9, 2024: Colonoscopy: Normal TI, Two subcentimeter tubular adenomas removed.  Two tattoo sites seen without residual polyp. External and internal hemorrhoids         Review of Systems   Constitutional:  Positive for fatigue. Negative for unexpected " weight change.   Respiratory:  Positive for shortness of breath.    Gastrointestinal:  Positive for abdominal pain, change in bowel habit, constipation, diarrhea and reflux. Negative for abdominal distention, anal bleeding, blood in stool, nausea, rectal pain, vomiting and fecal incontinence.   Musculoskeletal:  Positive for arthralgias (knee and hips, shoulder). Negative for back pain, gait problem, joint swelling and joint deformity.   Neurological:  Negative for dizziness and light-headedness.       Medical/Surgical History:   Past Medical History:   Diagnosis Date    Essential (primary) hypertension     GERD (gastroesophageal reflux disease)     Irritable bowel syndrome     Mixed hyperlipidemia      Past Surgical History:   Procedure Laterality Date    COLONOSCOPY      COLONOSCOPY N/A 2/9/2024    Procedure: COLON;  Surgeon: Aurea Lara MD;  Location: [facility] ENDOSCOPY;  Service: Gastroenterology;  Laterality: N/A;    EGD, WITH CLOSED BIOPSY  2/9/2024    Procedure: EGD, WITH CLOSED BIOPSY;  Surgeon: Aurea Lara MD;  Location: [facility] ENDOSCOPY;  Service: Gastroenterology;;    ESOPHAGOGASTRODUODENOSCOPY      ESOPHAGOGASTRODUODENOSCOPY N/A 2/9/2024    Procedure: DOUBLE;  Surgeon: Aurea Lara MD;  Location: [facility] ENDOSCOPY;  Service: Gastroenterology;  Laterality: N/A;         Family History:   Family History   Problem Relation Name Age of Onset    Diabetes Mother      Heart disease Mother      Cancer Father      Cervical cancer Sister      Heart failure Brother      Crohn's disease Other Cousin         Social History:   Social History     Socioeconomic History    Marital status:    Tobacco Use    Smoking status: Former     Types: Cigarettes     Passive exposure: Never    Smokeless tobacco: Never    Tobacco comments:     Quit 1994   Substance and Sexual Activity    Alcohol use: Never    Drug use: Yes     Types: Marijuana        Review of patient's allergies  "indicates:  No Known Allergies    Current Medications:   Current Outpatient Medications   Medication Sig Dispense Refill    cholecalciferol, vitamin D3, 1,250 mcg (50,000 unit) capsule Take 50,000 Units by mouth every 7 days.      diclofenac (VOLTAREN) 75 MG EC tablet Take 75 mg by mouth 2 (two) times daily.      hydroCHLOROthiazide (HYDRODIURIL) 50 MG tablet Take 50 mg by mouth once daily.      lansoprazole (PREVACID) 30 MG capsule Take 30 mg by mouth once daily.      levocetirizine (XYZAL) 5 MG tablet Take 5 mg by mouth daily as needed.      losartan-hydrochlorothiazide 50-12.5 mg (HYZAAR) 50-12.5 mg per tablet Take 1 tablet by mouth once daily.      potassium chloride (K-TAB) 20 mEq Take 20 mEq by mouth once daily.      rosuvastatin (CRESTOR) 10 MG tablet Take 10 mg by mouth once daily.      sildenafiL (VIAGRA) 100 MG tablet Take 100 mg by mouth daily as needed.      UNABLE TO FIND medication name: IB Guard      amitriptyline (ELAVIL) 10 MG tablet Take 1 tablet (10 mg total) by mouth every evening. 30 tablet 11     No current facility-administered medications for this visit.        Vital Signs:  /73 (BP Location: Left arm, Patient Position: Sitting, BP Method: Medium (Automatic))   Pulse (!) 59   Temp 97.9 °F (36.6 °C) (Oral)   Resp 12   Ht 5' 6" (1.676 m)   Wt 92.4 kg (203 lb 11.3 oz)   BMI 32.88 kg/m²      Physical Exam  Vitals reviewed.   Constitutional:       Appearance: Normal appearance. He is obese.   HENT:      Head: Normocephalic and atraumatic.      Mouth/Throat:      Mouth: Mucous membranes are moist.   Eyes:      General: No scleral icterus.     Extraocular Movements: Extraocular movements intact.      Conjunctiva/sclera: Conjunctivae normal.   Cardiovascular:      Rate and Rhythm: Normal rate and regular rhythm.   Pulmonary:      Effort: Pulmonary effort is normal. No respiratory distress.      Breath sounds: Normal breath sounds. No wheezing.   Abdominal:      General: Bowel sounds are " "normal. There is no distension.      Palpations: Abdomen is soft. There is no mass.      Tenderness: There is abdominal tenderness (mild) in the epigastric area. There is no guarding or rebound.   Musculoskeletal:      Cervical back: Normal range of motion.      Right lower leg: No edema.      Left lower leg: No edema.   Skin:     General: Skin is warm and dry.      Coloration: Skin is not jaundiced.   Neurological:      General: No focal deficit present.      Mental Status: He is alert and oriented to person, place, and time.   Psychiatric:         Mood and Affect: Mood normal.         Behavior: Behavior normal.         Labs: Reviewed  Hgb   Date Value Ref Range Status   10/24/2023 14.6 14.0 - 18.0 g/dL Final     Albumin Level   Date Value Ref Range Status   10/24/2023 4.1 3.5 - 5.0 g/dL Final     Iron Level   Date Value Ref Range Status   10/24/2023 76 65 - 175 ug/dL Final     Ferritin Level   Date Value Ref Range Status   10/24/2023 141.45 21.81 - 274.66 ng/mL Final     Vit D 25 OH   Date Value Ref Range Status   10/24/2023 117.8 (H) 30.0 - 80.0 ng/mL Final     C-Reactive Protein   Date Value Ref Range Status   10/24/2023 4.10 <5.00 mg/L Final     Calprotectin, F   Date Value Ref Range Status   12/01/2023 159 (H) <50.0 (Normal) mcg/g Final     Comment:     Interpretation: Abnormal (>120 mcg/g)     Test Performed by:  [place] [address]  : Dave Orourke M.D. Ph.D.; CLIA# [id number]      Fecal calprotectin: 159        Assessment/Plan:      Problem List Items Addressed This Visit          GI    H/O adenomatous polyp of colon     June 16, 2023: Colonoscopy (Dr. Mercado): Benign 3-4 mm polyp in sigmoid s/p forceps polypectomy. 1.5 cm distal transverse colon "mass" s/p snare resection and tattoo.  Hepatic flexure 8-9 mm mass vs sessile polyp s/p partial forceps polypectomy with tattoo.  Pathology: tubular adenomas     "   February 9, 2024: Colonoscopy: Normal TI, Two subcentimeter tubular adenomas removed.  Two tattoo sites seen without residual polyp. External and internal hemorrhoids     Repeat 2027         Hepatic steatosis     Normal LFTs  Fib 4: 0.91 - Low risk for fibrosis    Likely MASLD, will pursue further work-up if needed.      We discussed the manifestations of metabolic dysfunction associated steatotic liver disease. At this time, there are no FDA approved therapy for metabolic dysfunction associated steatotic liver disease.  The patient and I discussed the importance of diet, exercise, and weight loss for management of MASLD. We discussed a low fat, low carb/sugar, high fiber diet and a goal of 30 minutes of exercise 5 times per week  Advised weight loss (10%), strict glycemic control and lipid control (statins are ok if needed, prescribing doctor will need to monitor LFTs per routine)     Discussed that fatty liver can progress to steatohepatitis and possibly to cirrhosis, putting one at increased risk for liver cancer, liver failure, and death     Recommend to avoid alcohol consumption. It is know that heavy alcohol use is associated with disease progression among patients with fatty liver disease. Information is unknown at this time of the effects on the liver with alcohol use in moderation.         Irritable bowel syndrome with both constipation and diarrhea - Primary     Labs unremarkable  Endoscopy as mentioned above without concerning findings  20+ year history of GI symptoms  Soluble fiber supplement daily  Continue Ibgard daily  Trial of amitriptyline 10 mg nightly with plans to increase dose to effectiveness  Discussed dietary measures - mainly consideration for low fodmap diet  Discussed exercise, good sleep habits, reduction and treatment of stress/anxiety/depression.    Discussed returning to normal daily duties   Can consider SIBO testing and/or empiric treatment based on insurance  capabilities      IBS/DGBI  Complex disorder often times without one inciting trigger and/or mechanism involved. .  Mixed diarrhea and constipation   Discussed therapy is multilayer process involving dietary alterations, medications (prescription and nonprescription medications), and treatment aimed at augmenting our consciousness as well as any abnormal viscerosomatic reflex.    The pathophysiology of IBS is complicated; however, it can generally be broken down into 4 major categories: increased gas production due to dietary/microbiome issues; increased bowel wall tension; augmented conscious perception of wall tension; and an abnormal viscero-somatic reflex.                  Relevant Medications    amitriptyline (ELAVIL) 10 MG tablet           HEALTH MAINTENANCE:     Vaccinations:    Influenza (inactive):  recommended annually   PCV 20 (Prevnar): recommended  Tetanus (TdaP): recommended every 10 years  HPV (males and females ages 11-44 yo): N/A  Meningococcal:  No risk factors   Hepatitis B: not immune  Hepatitis A:  not immune  MMR (live vaccine): UTD        Chickenpox status/Varicella (live vaccine):  +Ab     Shingrix: recommend  COVID-19: vaccinated 2021      Follow up: 3 months

## 2024-04-29 NOTE — ASSESSMENT & PLAN NOTE
Labs unremarkable  Endoscopy as mentioned above without concerning findings  20+ year history of GI symptoms  Soluble fiber supplement daily  Continue Ibgard daily  Trial of amitriptyline 10 mg nightly with plans to increase dose to effectiveness  Discussed dietary measures - mainly consideration for low fodmap diet  Discussed exercise, good sleep habits, reduction and treatment of stress/anxiety/depression.    Discussed returning to normal daily duties   Can consider SIBO testing and/or empiric treatment based on insurance capabilities      IBS/DGBI  Complex disorder often times without one inciting trigger and/or mechanism involved. .  Mixed diarrhea and constipation   Discussed therapy is multilayer process involving dietary alterations, medications (prescription and nonprescription medications), and treatment aimed at augmenting our consciousness as well as any abnormal viscerosomatic reflex.    The pathophysiology of IBS is complicated; however, it can generally be broken down into 4 major categories: increased gas production due to dietary/microbiome issues; increased bowel wall tension; augmented conscious perception of wall tension; and an abnormal viscero-somatic reflex.

## 2024-04-29 NOTE — ASSESSMENT & PLAN NOTE
Normal LFTs  Fib 4: 0.91 - Low risk for fibrosis    Likely MASLD, will pursue further work-up if needed.      We discussed the manifestations of metabolic dysfunction associated steatotic liver disease. At this time, there are no FDA approved therapy for metabolic dysfunction associated steatotic liver disease.  The patient and I discussed the importance of diet, exercise, and weight loss for management of MASLD. We discussed a low fat, low carb/sugar, high fiber diet and a goal of 30 minutes of exercise 5 times per week  Advised weight loss (10%), strict glycemic control and lipid control (statins are ok if needed, prescribing doctor will need to monitor LFTs per routine)     Discussed that fatty liver can progress to steatohepatitis and possibly to cirrhosis, putting one at increased risk for liver cancer, liver failure, and death     Recommend to avoid alcohol consumption. It is know that heavy alcohol use is associated with disease progression among patients with fatty liver disease. Information is unknown at this time of the effects on the liver with alcohol use in moderation.

## 2024-04-29 NOTE — ASSESSMENT & PLAN NOTE
"June 16, 2023: Colonoscopy (Dr. Mercado): Benign 3-4 mm polyp in sigmoid s/p forceps polypectomy. 1.5 cm distal transverse colon "mass" s/p snare resection and tattoo.  Hepatic flexure 8-9 mm mass vs sessile polyp s/p partial forceps polypectomy with tattoo.  Pathology: tubular adenomas       February 9, 2024: Colonoscopy: Normal TI, Two subcentimeter tubular adenomas removed.  Two tattoo sites seen without residual polyp. External and internal hemorrhoids     Repeat 2027  "

## 2024-04-30 ENCOUNTER — OFFICE VISIT (OUTPATIENT)
Dept: GASTROENTEROLOGY | Facility: CLINIC | Age: 54
End: 2024-04-30
Payer: MEDICAID

## 2024-04-30 VITALS
HEIGHT: 66 IN | BODY MASS INDEX: 32.73 KG/M2 | DIASTOLIC BLOOD PRESSURE: 73 MMHG | HEART RATE: 59 BPM | WEIGHT: 203.69 LBS | SYSTOLIC BLOOD PRESSURE: 137 MMHG | TEMPERATURE: 98 F | RESPIRATION RATE: 12 BRPM

## 2024-04-30 DIAGNOSIS — K76.0 HEPATIC STEATOSIS: ICD-10-CM

## 2024-04-30 DIAGNOSIS — Z86.010 H/O ADENOMATOUS POLYP OF COLON: ICD-10-CM

## 2024-04-30 DIAGNOSIS — K58.2 IRRITABLE BOWEL SYNDROME WITH BOTH CONSTIPATION AND DIARRHEA: Primary | ICD-10-CM

## 2024-04-30 PROCEDURE — 99214 OFFICE O/P EST MOD 30 MIN: CPT | Mod: PBBFAC

## 2024-04-30 RX ORDER — AMITRIPTYLINE HYDROCHLORIDE 10 MG/1
10 TABLET, FILM COATED ORAL NIGHTLY
Qty: 30 TABLET | Refills: 11 | Status: SHIPPED | OUTPATIENT
Start: 2024-04-30 | End: 2025-04-30

## 2024-04-30 RX ORDER — HYDROCHLOROTHIAZIDE 50 MG/1
50 TABLET ORAL DAILY
COMMUNITY

## 2024-04-30 RX ORDER — DICLOFENAC SODIUM 75 MG/1
75 TABLET, DELAYED RELEASE ORAL 2 TIMES DAILY
COMMUNITY
Start: 2023-11-21

## 2024-04-30 RX ORDER — ASPIRIN 325 MG
50000 TABLET, DELAYED RELEASE (ENTERIC COATED) ORAL
COMMUNITY
Start: 2024-02-08

## 2024-09-06 NOTE — TRANSFER OF CARE
Anesthesia Transfer of Care Note    Patient: Aakash Knox    Procedure(s) Performed: Procedure(s) (LRB):  DOUBLE (N/A)  COLON (N/A)  EGD, WITH CLOSED BIOPSY    Patient location: GI    Anesthesia Type: general    Transport from OR: Transported from OR on room air with adequate spontaneous ventilation    Post pain: adequate analgesia    Post assessment: no apparent anesthetic complications and tolerated procedure well    Post vital signs: stable    Level of consciousness: sedated and responds to stimulation    Nausea/Vomiting: no nausea/vomiting    Complications: none    Transfer of care protocol was followed         Female

## 2024-11-19 ENCOUNTER — TELEPHONE (OUTPATIENT)
Dept: GASTROENTEROLOGY | Facility: CLINIC | Age: 54
End: 2024-11-19

## 2024-11-19 ENCOUNTER — OFFICE VISIT (OUTPATIENT)
Dept: GASTROENTEROLOGY | Facility: CLINIC | Age: 54
End: 2024-11-19
Payer: MEDICAID

## 2024-11-19 VITALS
HEART RATE: 69 BPM | SYSTOLIC BLOOD PRESSURE: 137 MMHG | WEIGHT: 213 LBS | DIASTOLIC BLOOD PRESSURE: 76 MMHG | RESPIRATION RATE: 12 BRPM | BODY MASS INDEX: 34.23 KG/M2 | HEIGHT: 66 IN | TEMPERATURE: 99 F

## 2024-11-19 DIAGNOSIS — Z23 NEED FOR VACCINATION: Primary | ICD-10-CM

## 2024-11-19 DIAGNOSIS — K58.2 IRRITABLE BOWEL SYNDROME WITH BOTH CONSTIPATION AND DIARRHEA: ICD-10-CM

## 2024-11-19 DIAGNOSIS — Z86.0101 H/O ADENOMATOUS POLYP OF COLON: ICD-10-CM

## 2024-11-19 DIAGNOSIS — K76.0 HEPATIC STEATOSIS: ICD-10-CM

## 2024-11-19 PROCEDURE — 90471 IMMUNIZATION ADMIN: CPT | Mod: PBBFAC

## 2024-11-19 PROCEDURE — 99214 OFFICE O/P EST MOD 30 MIN: CPT | Mod: PBBFAC | Performed by: INTERNAL MEDICINE

## 2024-11-19 PROCEDURE — 90656 IIV3 VACC NO PRSV 0.5 ML IM: CPT | Mod: PBBFAC

## 2024-11-19 RX ORDER — SUCRALFATE 1 G/1
1 TABLET ORAL
Qty: 45 TABLET | Refills: 5 | Status: SHIPPED | OUTPATIENT
Start: 2024-11-19

## 2024-11-19 RX ADMIN — INFLUENZA VIRUS VACCINE 0.5 ML: 15; 15; 15 SUSPENSION INTRAMUSCULAR at 12:11

## 2024-11-19 NOTE — ASSESSMENT & PLAN NOTE
Normal LFTs  Fib 4: 0.91 - Low risk for fibrosis     Likely MASLD, will pursue further work-up if needed.      We discussed the manifestations of metabolic dysfunction associated steatotic liver disease. At this time, there are no FDA approved therapy for metabolic dysfunction associated steatotic liver disease.  The patient and I discussed the importance of diet, exercise, and weight loss for management of MASLD. We discussed a low fat, low carb/sugar, high fiber diet and a goal of 30 minutes of exercise 5 times per week  Advised weight loss (10%), strict glycemic control and lipid control (statins are ok if needed, prescribing doctor will need to monitor LFTs per routine)     Discussed that fatty liver can progress to steatohepatitis and possibly to cirrhosis, putting one at increased risk for liver cancer, liver failure, and death     Recommend to avoid alcohol consumption. It is know that heavy alcohol use is associated with disease progression among patients with fatty liver disease. Information is unknown at this time of the effects on the liver with alcohol use in moderation    Schedule Fibroscan.   Treat with Remdesivir if F2/F3

## 2024-11-19 NOTE — PROGRESS NOTES
Gastroenterology and Hepatology           Patient Note         TODAY'S VISIT DATE:  11/19/2024    Reason for Consult:    Chief Complaint   Patient presents with    Irritable Bowel Syndrome    Hepatic steatosis       PCP: Hugh Callahan.      Referring MD:   No ref. provider found    History of Present Illness:    Mr. Knox is a 53 year old AAM with adenomatous colon polyps, IBS, dyspepsia here for follow-up     When initially evaluated he reported a 20 year history of severe crampy abdominal pain, which persists throughout the entire day and  alleviated with holding the stomach.  He reported having alternating constipation and diarrhea without any bleeding. He had been limited in his ability to work because he has weakness, presyncopal episodes.  He denied any weight loss.  He had been on multiple PPIs, which initially work, but then become ineffective at treating his stomach pain. PPIs did seem to help with heartburn.    He was referred to Dr. Mercado for evaluation of symptoms and colonoscopy.  Colonoscopy showed small polyps (largest 1.5 cm) all adenomas.  One polyp not able to be removed.  He was evaluated then for surgical resection of polyps.  Pre operative CT scan showed findings concerning for TI disease.  He was subsequently referred here.      CBC, CMP, Iron panel, ferritin, TSH all normal.  Fecal calprotectin: 159.     I performed a EGD and colonoscopy on February 9, 2024.   EGD: irregular Z line but otherwise normal findings and negative H pylori and celiac biopsies  Colonoscopy: Normal TI, Two subcentimeter tubular adenomas removed.  Two tattoo sites seen without residual polyp. External and internal hemorrhoids     I recommended soluble fiber supplement and Ibgard.     When last seen he continued to reported generalized abdominal cramping, alternating diarrhea and constipation.  Pepto prn helped the diarrhea but would precipitate constipation.  Ibgard helped with cramping.  I started  "him on amitriptyline 10 mg at night.     He is taking the amitriptyline at night.  He is having a bowel movement every day, stools usually start off formed or hard, then the next two stools towards the end of the day are more loose.  He will have on average 3 stools every other day.  He denies any bleeding.  He denies significant constipation.  The amitriptyline has been helpful with his abdominal pain.  As long as he takes the medication and avoids constipation, he does not have abdominal pain.  He has noticed some sensitivity to liquids, even water, especially cold or even room temperature triggering some abdominal pain.  This has been treated with prn sucralfate in the past.   He did try fiber to help with stools but this made him more constipated.      He has been told he had ulcers in the past on EGD.  He is cycled on PPI by PCP, currently on prevacid which helps with heartburn symptom.      He smokes marijuana. He denies any FH of IBD, colon polyps or colon cancer.  Former smoker.  He denies any alcohol or recreational drug use.      Pertinent Endoscopy/Imaging:  February 13, 2019: EGD (Dr. Dennis): necrotic appearing mucosa from mid to distal esophagus with surrounding inflammation, small HH. Mild erosive gastritis    March 20, 2019: EGD (Dr. Dennis): normal esophagus, stomach, duodenum    September 18, 2019: Colonoscopy (Dr. Dennis): Grade II hemorrhoids otherwise normal colon    June 16, 2023: Colonoscopy (Dr. Mercado): Benign 3-4 mm polyp in sigmoid s/p forceps polypectomy. 1.5 cm distal transverse colon "mass" s/p snare resection and tattoo.  Hepatic flexure 8-9 mm mass vs sessile polyp s/p partial forceps polypectomy with tattoo.  Pathology: tubular adenomas     June 28, 2023: CT a/p with contrast: Segmental wall thickening/fatty infiltration of the ileum.  Fatty infiltration of wall of colon.  No evidence for acute inflammation.  Findings concerning for chronic inflammation such as IBD.  Hepatic " steatosis.     February 9, 2024: EGD: irregular Z line but otherwise normal findings and negative H pylori and celiac biopsies  February 9, 2024: Colonoscopy: Normal TI, Two subcentimeter tubular adenomas removed.  Two tattoo sites seen without residual polyp. External and internal hemorrhoids         Review of Systems   Constitutional:  Positive for fatigue. Negative for unexpected weight change.   Respiratory:  Positive for shortness of breath.    Gastrointestinal:  Positive for abdominal pain, change in bowel habit, constipation, diarrhea and reflux. Negative for abdominal distention, anal bleeding, blood in stool, nausea, rectal pain, vomiting and fecal incontinence.   Musculoskeletal:  Positive for arthralgias (knee and hips, shoulder). Negative for back pain, gait problem, joint swelling and joint deformity.   Neurological:  Negative for dizziness and light-headedness.       Medical/Surgical History:   Past Medical History:   Diagnosis Date    Essential (primary) hypertension     GERD (gastroesophageal reflux disease)     Irritable bowel syndrome     Mixed hyperlipidemia      Past Surgical History:   Procedure Laterality Date    COLONOSCOPY      COLONOSCOPY N/A 2/9/2024    Procedure: COLON;  Surgeon: Aurea Lara MD;  Location: [facility] ENDOSCOPY;  Service: Gastroenterology;  Laterality: N/A;    EGD, WITH CLOSED BIOPSY  2/9/2024    Procedure: EGD, WITH CLOSED BIOPSY;  Surgeon: Aurea Lara MD;  Location: [facility] ENDOSCOPY;  Service: Gastroenterology;;    ESOPHAGOGASTRODUODENOSCOPY      ESOPHAGOGASTRODUODENOSCOPY N/A 2/9/2024    Procedure: DOUBLE;  Surgeon: Aurea Lara MD;  Location: [facility] ENDOSCOPY;  Service: Gastroenterology;  Laterality: N/A;         Family History:   Family History   Problem Relation Name Age of Onset    Diabetes Mother      Heart disease Mother      Cancer Father      Cervical cancer Sister      Heart failure Brother      Crohn's disease Other Cousin   "       Social History:   Social History     Socioeconomic History    Marital status:    Tobacco Use    Smoking status: Former     Types: Cigarettes     Passive exposure: Never    Smokeless tobacco: Never    Tobacco comments:     Quit 1994   Substance and Sexual Activity    Alcohol use: Never    Drug use: Yes     Types: Marijuana        Review of patient's allergies indicates:  No Known Allergies    Current Medications:   Outpatient Medications Marked as Taking for the 11/19/24 encounter (Office Visit) with Aurea Lara MD   Medication Sig Dispense Refill    amitriptyline (ELAVIL) 10 MG tablet Take 1 tablet (10 mg total) by mouth every evening. 30 tablet 11    cholecalciferol, vitamin D3, 1,250 mcg (50,000 unit) capsule Take 50,000 Units by mouth every 7 days.      diclofenac (VOLTAREN) 75 MG EC tablet Take 75 mg by mouth 2 (two) times daily.      hydroCHLOROthiazide (HYDRODIURIL) 50 MG tablet Take 50 mg by mouth once daily.      lansoprazole (PREVACID) 30 MG capsule Take 30 mg by mouth once daily.      levocetirizine (XYZAL) 5 MG tablet Take 5 mg by mouth daily as needed.      losartan-hydrochlorothiazide 50-12.5 mg (HYZAAR) 50-12.5 mg per tablet Take 1 tablet by mouth once daily.      potassium chloride (K-TAB) 20 mEq Take 20 mEq by mouth once daily.      rosuvastatin (CRESTOR) 10 MG tablet Take 10 mg by mouth once daily.      sildenafiL (VIAGRA) 100 MG tablet Take 100 mg by mouth daily as needed.      UNABLE TO FIND medication name: IB Guard          Vital Signs:  /76 (BP Location: Left arm, Patient Position: Sitting)   Pulse 69   Temp 98.6 °F (37 °C) (Oral)   Resp 12   Ht 5' 6" (1.676 m)   Wt 96.6 kg (213 lb)   BMI 34.38 kg/m²      Physical Exam  Vitals reviewed.   Constitutional:       Appearance: Normal appearance. He is obese.   HENT:      Head: Normocephalic and atraumatic.      Mouth/Throat:      Mouth: Mucous membranes are moist.   Eyes:      General: No scleral icterus.     " Extraocular Movements: Extraocular movements intact.      Conjunctiva/sclera: Conjunctivae normal.   Cardiovascular:      Rate and Rhythm: Normal rate and regular rhythm.   Pulmonary:      Effort: Pulmonary effort is normal. No respiratory distress.      Breath sounds: Normal breath sounds. No wheezing.   Abdominal:      General: Bowel sounds are normal. There is no distension.      Palpations: Abdomen is soft. There is no mass.      Tenderness: There is abdominal tenderness (mild) in the epigastric area. There is no guarding or rebound.   Musculoskeletal:      Cervical back: Normal range of motion.      Right lower leg: No edema.      Left lower leg: No edema.   Skin:     General: Skin is warm and dry.      Coloration: Skin is not jaundiced.   Neurological:      General: No focal deficit present.      Mental Status: He is alert and oriented to person, place, and time.   Psychiatric:         Mood and Affect: Mood normal.         Behavior: Behavior normal.         Labs: Reviewed  Hgb   Date Value Ref Range Status   10/24/2023 14.6 14.0 - 18.0 g/dL Final     Albumin   Date Value Ref Range Status   10/24/2023 4.1 3.5 - 5.0 g/dL Final     Iron Level   Date Value Ref Range Status   10/24/2023 76 65 - 175 ug/dL Final     Ferritin Level   Date Value Ref Range Status   10/24/2023 141.45 21.81 - 274.66 ng/mL Final     Vitamin D   Date Value Ref Range Status   10/24/2023 117.8 (H) 30.0 - 80.0 ng/mL Final     CRP   Date Value Ref Range Status   10/24/2023 4.10 <5.00 mg/L Final     Calprotectin, F   Date Value Ref Range Status   12/01/2023 159 (H) <50.0 (Normal) mcg/g Final     Comment:     Interpretation: Abnormal (>120 mcg/g)     Test Performed by:  [facility]  [address] [place] [address]  : Dave Orourke M.D. Ph.D.; CLIA# [id number]      Fecal calprotectin: 159        Assessment/Plan:      Problem List Items Addressed This Visit       H/O adenomatous  "polyp of colon     June 16, 2023: Colonoscopy (Dr. Mercado): Benign 3-4 mm polyp in sigmoid s/p forceps polypectomy. 1.5 cm distal transverse colon "mass" s/p snare resection and tattoo.  Hepatic flexure 8-9 mm mass vs sessile polyp s/p partial forceps polypectomy with tattoo.  Pathology: tubular adenomas       February 9, 2024: Colonoscopy: Normal TI, Two subcentimeter tubular adenomas removed.  Two tattoo sites seen without residual polyp. External and internal hemorrhoids      Repeat 2027         Hepatic steatosis     Normal LFTs  Fib 4: 0.91 - Low risk for fibrosis     Likely MASLD, will pursue further work-up if needed.      We discussed the manifestations of metabolic dysfunction associated steatotic liver disease. At this time, there are no FDA approved therapy for metabolic dysfunction associated steatotic liver disease.  The patient and I discussed the importance of diet, exercise, and weight loss for management of MASLD. We discussed a low fat, low carb/sugar, high fiber diet and a goal of 30 minutes of exercise 5 times per week  Advised weight loss (10%), strict glycemic control and lipid control (statins are ok if needed, prescribing doctor will need to monitor LFTs per routine)     Discussed that fatty liver can progress to steatohepatitis and possibly to cirrhosis, putting one at increased risk for liver cancer, liver failure, and death     Recommend to avoid alcohol consumption. It is know that heavy alcohol use is associated with disease progression among patients with fatty liver disease. Information is unknown at this time of the effects on the liver with alcohol use in moderation    Schedule Fibroscan.   Treat with Remdesivir if F2/F3         Irritable bowel syndrome with both constipation and diarrhea     Labs unremarkable  Endoscopy as mentioned above without concerning findings  20+ year history of GI symptoms  Soluble fiber supplement daily made him feel more constipated  Amitriptyline 10 mg " nightly has been effective with global symptoms of abdominal pain, more regularity to stools  Continue amitriptyline 10 mg at night.  Can increase dose if needed in the future.   Sucralfate prn for dietary indiscretion  Discussed dietary measures - mainly consideration for low fodmap diet  Discussed exercise, good sleep habits, reduction and treatment of stress/anxiety/depression.    Discussed returning to normal daily duties   Can consider SIBO testing and/or empiric treatment based on insurance capabilities if symptoms return.       IBS/DGBI  Complex disorder often times without one inciting trigger and/or mechanism involved. .  Mixed diarrhea and constipation   Discussed therapy is multilayer process involving dietary alterations, medications (prescription and nonprescription medications), and treatment aimed at augmenting our consciousness as well as any abnormal viscerosomatic reflex.              Other Visit Diagnoses       Need for vaccination    -  Primary    Relevant Medications    influenza (Flulaval, Fluzone, Fluarix) 45 mcg/0.5 mL IM vaccine (> or = 6 mo) 0.5 mL (Completed) (Start on 11/19/2024 12:30 PM)                HEALTH MAINTENANCE:     Vaccinations:    Influenza (inactive):  Nov 2024   PCV 20 (Prevnar):  Tetanus (TdaP): recommended every 10 years  HPV (males and females ages 11-44 yo): N/A  Meningococcal:  No risk factors   Hepatitis B: not immune  Hepatitis A:  not immune  MMR (live vaccine): UTD        Chickenpox status/Varicella (live vaccine):  +Ab     Shingrix: recommend  COVID-19: vaccinated 2021      Follow up: 4 months

## 2024-11-19 NOTE — TELEPHONE ENCOUNTER
----- Message from Krystal sent at 11/19/2024  1:06 PM CST -----  Regarding: clarifiation on prescription  [facility] called needing clarification on sucralfate (CARAFATE) 1 gram tablet. Please contact [phone]

## 2024-11-19 NOTE — ASSESSMENT & PLAN NOTE
Labs unremarkable  Endoscopy as mentioned above without concerning findings  20+ year history of GI symptoms  Soluble fiber supplement daily made him feel more constipated  Amitriptyline 10 mg nightly has been effective with global symptoms of abdominal pain, more regularity to stools  Continue amitriptyline 10 mg at night.  Can increase dose if needed in the future.   Sucralfate prn for dietary indiscretion  Discussed dietary measures - mainly consideration for low fodmap diet  Discussed exercise, good sleep habits, reduction and treatment of stress/anxiety/depression.    Discussed returning to normal daily duties   Can consider SIBO testing and/or empiric treatment based on insurance capabilities if symptoms return.       IBS/DGBI  Complex disorder often times without one inciting trigger and/or mechanism involved. .  Mixed diarrhea and constipation   Discussed therapy is multilayer process involving dietary alterations, medications (prescription and nonprescription medications), and treatment aimed at augmenting our consciousness as well as any abnormal viscerosomatic reflex.

## 2025-05-03 DIAGNOSIS — K58.2 IRRITABLE BOWEL SYNDROME WITH BOTH CONSTIPATION AND DIARRHEA: ICD-10-CM

## 2025-05-05 RX ORDER — AMITRIPTYLINE HYDROCHLORIDE 10 MG/1
10 TABLET, FILM COATED ORAL NIGHTLY
Qty: 30 TABLET | Refills: 0 | Status: SHIPPED | OUTPATIENT
Start: 2025-05-05

## 2025-05-29 ENCOUNTER — TELEPHONE (OUTPATIENT)
Dept: GASTROENTEROLOGY | Facility: CLINIC | Age: 55
End: 2025-05-29

## 2025-05-29 ENCOUNTER — OFFICE VISIT (OUTPATIENT)
Dept: GASTROENTEROLOGY | Facility: CLINIC | Age: 55
End: 2025-05-29
Payer: MEDICAID

## 2025-05-29 ENCOUNTER — PATIENT MESSAGE (OUTPATIENT)
Dept: GASTROENTEROLOGY | Facility: CLINIC | Age: 55
End: 2025-05-29

## 2025-05-29 VITALS
BODY MASS INDEX: 33.94 KG/M2 | SYSTOLIC BLOOD PRESSURE: 118 MMHG | HEIGHT: 66 IN | RESPIRATION RATE: 16 BRPM | DIASTOLIC BLOOD PRESSURE: 76 MMHG | HEART RATE: 60 BPM | WEIGHT: 211.19 LBS | TEMPERATURE: 98 F | OXYGEN SATURATION: 98 %

## 2025-05-29 DIAGNOSIS — Z86.0101 H/O ADENOMATOUS POLYP OF COLON: Primary | ICD-10-CM

## 2025-05-29 DIAGNOSIS — R74.8 ELEVATED ALKALINE PHOSPHATASE LEVEL: Primary | ICD-10-CM

## 2025-05-29 DIAGNOSIS — K58.2 IRRITABLE BOWEL SYNDROME WITH BOTH CONSTIPATION AND DIARRHEA: ICD-10-CM

## 2025-05-29 DIAGNOSIS — K76.0 HEPATIC STEATOSIS: ICD-10-CM

## 2025-05-29 PROCEDURE — 99215 OFFICE O/P EST HI 40 MIN: CPT | Mod: PBBFAC | Performed by: NURSE PRACTITIONER

## 2025-05-29 RX ORDER — TADALAFIL 20 MG/1
20 TABLET ORAL DAILY PRN
COMMUNITY
Start: 2025-05-09

## 2025-05-29 RX ORDER — PREDNISONE 20 MG/1
20 TABLET ORAL 2 TIMES DAILY
COMMUNITY
Start: 2025-05-09

## 2025-05-29 NOTE — ASSESSMENT & PLAN NOTE
Normal LFTs  Fib 4: 0.91 - Low risk for fibrosis     Likely MASLD, will pursue further work-up if needed.      We discussed the manifestations of metabolic dysfunction associated steatotic liver disease. At this time, there are no FDA approved therapy for metabolic dysfunction associated steatotic liver disease.  The patient and I discussed the importance of diet, exercise, and weight loss for management of MASLD. We discussed a low fat, low carb/sugar, high fiber diet and a goal of 30 minutes of exercise 5 times per week  Advised weight loss (10%), strict glycemic control and lipid control (statins are ok if needed, prescribing doctor will need to monitor LFTs per routine)     Discussed that fatty liver can progress to steatohepatitis and possibly to cirrhosis, putting one at increased risk for liver cancer, liver failure, and death     Recommend to avoid alcohol consumption. It is known that heavy alcohol use is associated with disease progression among patients with fatty liver disease. Information is unknown at this time of the effects on the liver with alcohol use in moderation     Abdominal ultrasound  Schedule Fibroscan  Treat with Remdesivir if F2/F3  Requested most recent laboratory results from PCP for further review

## 2025-05-29 NOTE — TELEPHONE ENCOUNTER
Please obtain most recent laboratory results from PCP, Hugh Callahan at [facility] for further review.  Thanks

## 2025-05-29 NOTE — PROGRESS NOTES
Subjective:       Patient ID: Aakash Knox is a 54 y.o. male.    Chief Complaint: Irritable Bowel Syndrome (No complaints)    Mr. Knox is a 54 year old AAM with adenomatous colon polyps, IBS, dyspepsia here for follow-up.  He was last seen by Dr. Lara November 19, 2024.      When initially evaluated, he reported a 20 year history of severe crampy abdominal pain, which persisted throughout the entire day and alleviated with holding the stomach.  He reported having alternating constipation and diarrhea without any bleeding.  He had been limited in his ability to work because he had weakness, presyncopal episodes.  He denied any weight loss.  He had been on multiple PPIs, which initially worked, but then became ineffective at treating his stomach pain.  PPIs did seem to help with heartburn.     He was referred to Dr. Mercado for evaluation of symptoms and colonoscopy.  Colonoscopy showed small polyps (largest 1.5 cm), all adenomas.  One polyp not able to be removed.  He was evaluated then for surgical resection of polyps.  Preoperative CT scan showed findings concerning for TI disease.  He was subsequently referred here.       CBC, CMP, Iron panel, ferritin, TSH all normal.  Fecal calprotectin: 159.      Dr. Lara performed EGD and colonoscopy on February 9, 2024.   EGD: irregular Z line but otherwise normal findings and negative H pylori and celiac biopsies.  Colonoscopy: Normal TI, two subcentimeter tubular adenomas removed.  Two tattoo sites seen without residual polyp.  External and internal hemorrhoids.      Dr. Lara recommended soluble fiber supplement and Ibgard.      When last seen prior to last appointment with Dr. Lara, he continued to reported generalized abdominal cramping, alternating diarrhea and constipation.  Pepto as needed helped the diarrhea but would precipitate constipation.  Ibgard helped with cramping.  He was started on amitriptyline 10 mg at night.      He was taking the  amitriptyline at night.  He was having a bowel movement every day, stools usually started off formed or hard, then the next two stools towards the end of the day were more loose.  He would have on average 3 stools every other day.  He denied any bleeding.  He denied significant constipation.  The amitriptyline had been helpful with his abdominal pain.  As long as he took the medication and avoided constipation, he did not have abdominal pain.  He had noticed some sensitivity to liquids, even water, especially cold or even room temperature triggering some abdominal pain.  This had been treated with PRN sucralfate in the past.   He did try fiber to help with stools but this made him more constipated.       He had been told he had ulcers in the past on EGD.  He was cycled on PPI by PCP, currently on Prevacid which helped with heartburn symptoms.      He presents today accompanied by his wife for a follow-up visit.  He continues to take Prevacid 30 mg daily, sucralfate 1 g before meals and at bedtime as needed, and amitriptyline 10 mg daily.  He reports feeling well at this time aside from more frequent constipation since he was last seen in clinic.  He typically has 1 formed to harder stool daily in the morning and does not always feel completely evacuated.  He take soluble fiber supplementation with occasional use of MiraLax with some relief noted.  He has abdominal bloating and gas.  He denies melena, hematochezia, fecal urgency, fecal incontinence, or pain with defecation.  His appetite is good and his weight is stable.  He denies fever, chills, nausea, vomiting, hematemesis, odynophagia, dysphagia, acid reflux, pyrosis, or early satiety.     He smokes marijuana. He denies any FH of IBD, colon polyps or colon cancer.  Former smoker.  He denies any alcohol or recreational drug use.       Pertinent Endoscopy/Imaging:  February 13, 2019: EGD (Dr. Dennis): necrotic appearing mucosa from mid to distal esophagus with  "surrounding inflammation, small HH. Mild erosive gastritis     March 20, 2019: EGD (Dr. Dennis): normal esophagus, stomach, duodenum     September 18, 2019: Colonoscopy (Dr. Dennis): Grade II hemorrhoids otherwise normal colon     June 16, 2023: Colonoscopy (Dr. Mercado): Benign 3-4 mm polyp in sigmoid s/p forceps polypectomy. 1.5 cm distal transverse colon "mass" s/p snare resection and tattoo.  Hepatic flexure 8-9 mm mass vs sessile polyp s/p partial forceps polypectomy with tattoo.  Pathology: tubular adenomas      June 28, 2023: CT a/p with contrast: Segmental wall thickening/fatty infiltration of the ileum.  Fatty infiltration of wall of colon.  No evidence for acute inflammation.  Findings concerning for chronic inflammation such as IBD.  Hepatic steatosis.      February 9, 2024: EGD: irregular Z line but otherwise normal findings and negative H pylori and celiac biopsies  February 9, 2024: Colonoscopy: Normal TI, Two subcentimeter tubular adenomas removed.  Two tattoo sites seen without residual polyp. External and internal hemorrhoids     Review of patient's allergies indicates:  No Known Allergies    Past Medical History:   Diagnosis Date    Essential (primary) hypertension     GERD (gastroesophageal reflux disease)     Irritable bowel syndrome     Mixed hyperlipidemia      Past Surgical History:   Procedure Laterality Date    COLONOSCOPY      COLONOSCOPY N/A 2/9/2024    Procedure: COLON;  Surgeon: Aurea Lara MD;  Location: [facility] ENDOSCOPY;  Service: Gastroenterology;  Laterality: N/A;    EGD, WITH CLOSED BIOPSY  2/9/2024    Procedure: EGD, WITH CLOSED BIOPSY;  Surgeon: Aurea Lara MD;  Location: [facility] ENDOSCOPY;  Service: Gastroenterology;;    ESOPHAGOGASTRODUODENOSCOPY      ESOPHAGOGASTRODUODENOSCOPY N/A 2/9/2024    Procedure: DOUBLE;  Surgeon: Aurea Lara MD;  Location: [facility] ENDOSCOPY;  Service: Gastroenterology;  Laterality: N/A;     Family History: "   family history includes Cancer in his father; Cervical cancer in his sister; Crohn's disease in an other family member; Diabetes in his mother; Heart disease in his mother; Heart failure in his brother.    Social History:    reports that he has quit smoking. His smoking use included cigarettes. He has never been exposed to tobacco smoke. He has never used smokeless tobacco. He reports current drug use. Drug: Marijuana. He reports that he does not drink alcohol.    Review of Systems  Negative except as noted in the HPI.      Objective:      Physical Exam  Constitutional:       Appearance: Normal appearance.   HENT:      Head: Normocephalic.      Mouth/Throat:      Mouth: Mucous membranes are moist.   Eyes:      Extraocular Movements: Extraocular movements intact.      Conjunctiva/sclera: Conjunctivae normal.      Pupils: Pupils are equal, round, and reactive to light.   Cardiovascular:      Rate and Rhythm: Normal rate and regular rhythm.      Pulses: Normal pulses.      Heart sounds: Normal heart sounds.   Pulmonary:      Effort: Pulmonary effort is normal.      Breath sounds: Normal breath sounds.   Abdominal:      General: Bowel sounds are normal.      Palpations: Abdomen is soft.   Musculoskeletal:         General: Normal range of motion.      Cervical back: Normal range of motion and neck supple.   Skin:     General: Skin is warm and dry.   Neurological:      General: No focal deficit present.      Mental Status: He is alert and oriented to person, place, and time.   Psychiatric:         Mood and Affect: Mood normal.         Behavior: Behavior normal.         Thought Content: Thought content normal.         Judgment: Judgment normal.         Home Medications:     Current Outpatient Medications   Medication Sig    amitriptyline (ELAVIL) 10 MG tablet TAKE 1 TABLET BY MOUTH ONCE DAILY IN THE EVENING    cholecalciferol, vitamin D3, 1,250 mcg (50,000 unit) capsule Take 50,000 Units by mouth every 7 days.     "diclofenac (VOLTAREN) 75 MG EC tablet Take 75 mg by mouth 2 (two) times daily.    hydroCHLOROthiazide (HYDRODIURIL) 50 MG tablet Take 50 mg by mouth once daily.    lansoprazole (PREVACID) 30 MG capsule Take 30 mg by mouth once daily.    levocetirizine (XYZAL) 5 MG tablet Take 5 mg by mouth daily as needed.    losartan-hydrochlorothiazide 50-12.5 mg (HYZAAR) 50-12.5 mg per tablet Take 1 tablet by mouth once daily.    potassium chloride (K-TAB) 20 mEq Take 20 mEq by mouth once daily.    predniSONE (DELTASONE) 20 MG tablet Take 20 mg by mouth 2 (two) times daily.    rosuvastatin (CRESTOR) 10 MG tablet Take 10 mg by mouth once daily.    sucralfate (CARAFATE) 1 gram tablet Take 1 tablet (1 g total) by mouth after meals as needed (indigestion, pain).    tadalafiL (CIALIS) 20 MG Tab Take 20 mg by mouth daily as needed.    linaCLOtide (LINZESS) 145 mcg Cap capsule Take 1 capsule (145 mcg total) by mouth before breakfast.    UNABLE TO FIND medication name: ALMITA Guard (Patient not taking: Reported on 5/29/2025)     No current facility-administered medications for this visit.     Assessment/Plan:     Problem List Items Addressed This Visit          GI    H/O adenomatous polyp of colon - Primary    June 16, 2023: Colonoscopy (Dr. Mercado): Benign 3-4 mm polyp in sigmoid s/p forceps polypectomy. 1.5 cm distal transverse colon "mass" s/p snare resection and tattoo.  Hepatic flexure 8-9 mm mass vs sessile polyp s/p partial forceps polypectomy with tattoo.  Pathology: tubular adenomas       February 9, 2024: Colonoscopy: Normal TI, Two subcentimeter tubular adenomas removed.  Two tattoo sites seen without residual polyp. External and internal hemorrhoids      Repeat 2027         Hepatic steatosis    Normal LFTs  Fib 4: 0.91 - Low risk for fibrosis     Likely MASLD, will pursue further work-up if needed.      We discussed the manifestations of metabolic dysfunction associated steatotic liver disease. At this time, there are no FDA " approved therapy for metabolic dysfunction associated steatotic liver disease.  The patient and I discussed the importance of diet, exercise, and weight loss for management of MASLD. We discussed a low fat, low carb/sugar, high fiber diet and a goal of 30 minutes of exercise 5 times per week  Advised weight loss (10%), strict glycemic control and lipid control (statins are ok if needed, prescribing doctor will need to monitor LFTs per routine)     Discussed that fatty liver can progress to steatohepatitis and possibly to cirrhosis, putting one at increased risk for liver cancer, liver failure, and death     Recommend to avoid alcohol consumption. It is known that heavy alcohol use is associated with disease progression among patients with fatty liver disease. Information is unknown at this time of the effects on the liver with alcohol use in moderation     Abdominal ultrasound  Schedule Fibroscan  Treat with Remdesivir if F2/F3  Requested most recent laboratory results from PCP for further review         Relevant Orders    US Abdomen Limited    US Elastography Liver w/imaging    Irritable bowel syndrome with both constipation and diarrhea    Labs unremarkable  Endoscopy as mentioned above without concerning findings  20+ year history of GI symptoms  Soluble fiber supplement daily made him feel more constipated  Amitriptyline 10 mg nightly has been effective with global symptoms of abdominal pain, more regularity to stools  Continue amitriptyline 10 mg at night.  Can increase dose if needed in the future.  Patient does not wish to increase dose at this time.  Sucralfate prn for dietary indiscretion  Discussed dietary measures - mainly consideration for low fodmap diet  Discussed exercise, good sleep habits, reduction and treatment of stress/anxiety/depression.    Discussed returning to normal daily duties   Can consider SIBO testing and/or empiric treatment based on insurance capabilities if symptoms return.    Linzess 145 mcg as directed       IBS/DGBI  Complex disorder often times without one inciting trigger and/or mechanism involved. .  Mixed diarrhea and constipation   Discussed therapy is multilayer process involving dietary alterations, medications (prescription and nonprescription medications), and treatment aimed at augmenting our consciousness as well as any abnormal viscerosomatic reflex.         Relevant Medications    linaCLOtide (LINZESS) 145 mcg Cap capsule

## 2025-05-29 NOTE — ASSESSMENT & PLAN NOTE
Labs unremarkable  Endoscopy as mentioned above without concerning findings  20+ year history of GI symptoms  Soluble fiber supplement daily made him feel more constipated  Amitriptyline 10 mg nightly has been effective with global symptoms of abdominal pain, more regularity to stools  Continue amitriptyline 10 mg at night.  Can increase dose if needed in the future.  Patient does not wish to increase dose at this time.  Sucralfate prn for dietary indiscretion  Discussed dietary measures - mainly consideration for low fodmap diet  Discussed exercise, good sleep habits, reduction and treatment of stress/anxiety/depression.    Discussed returning to normal daily duties   Can consider SIBO testing and/or empiric treatment based on insurance capabilities if symptoms return.   Linzess 145 mcg as directed       IBS/DGBI  Complex disorder often times without one inciting trigger and/or mechanism involved. .  Mixed diarrhea and constipation   Discussed therapy is multilayer process involving dietary alterations, medications (prescription and nonprescription medications), and treatment aimed at augmenting our consciousness as well as any abnormal viscerosomatic reflex.

## 2025-06-06 ENCOUNTER — HOSPITAL ENCOUNTER (OUTPATIENT)
Dept: RADIOLOGY | Facility: HOSPITAL | Age: 55
Discharge: HOME | End: 2025-06-06
Attending: NURSE PRACTITIONER
Payer: MEDICAID

## 2025-06-06 ENCOUNTER — PROCEDURE VISIT (OUTPATIENT)
Dept: GASTROENTEROLOGY | Facility: CLINIC | Age: 55
End: 2025-06-06
Payer: MEDICAID

## 2025-06-06 DIAGNOSIS — K76.0 HEPATIC STEATOSIS: Primary | ICD-10-CM

## 2025-06-06 DIAGNOSIS — K76.0 HEPATIC STEATOSIS: ICD-10-CM

## 2025-06-06 PROCEDURE — 76705 ECHO EXAM OF ABDOMEN: CPT | Mod: TC

## 2025-06-09 ENCOUNTER — RESULTS FOLLOW-UP (OUTPATIENT)
Dept: GASTROENTEROLOGY | Facility: CLINIC | Age: 55
End: 2025-06-09

## (undated) DEVICE — TIP SUCTION YANKAUER

## (undated) DEVICE — TUBING O2 FEMALE CONN 13FT

## (undated) DEVICE — TRAP ETRAP POLYP 50 TRAY

## (undated) DEVICE — COLLECTION SPECIMEN NEPTUNE

## (undated) DEVICE — KIT CANIST SUCTION 1200CC

## (undated) DEVICE — SOL IRRI STRL WATER 1000ML

## (undated) DEVICE — UNDERPAD DISPOSABLE 30X30IN

## (undated) DEVICE — ADAPTER DUAL NSL LUER M-M 7FT

## (undated) DEVICE — BAG LABGUARD BIOHAZARD 6X9IN

## (undated) DEVICE — CONTAINER SPECIMEN SCREW 4OZ

## (undated) DEVICE — FORCEP BIOPSY ENDO 2.8MM 240CM

## (undated) DEVICE — KIT SURGICAL COLON .25 1.1OZ

## (undated) DEVICE — BLOCK BLOX BITE DENT RIM 54FR